# Patient Record
Sex: FEMALE | Race: WHITE | NOT HISPANIC OR LATINO | Employment: OTHER | URBAN - METROPOLITAN AREA
[De-identification: names, ages, dates, MRNs, and addresses within clinical notes are randomized per-mention and may not be internally consistent; named-entity substitution may affect disease eponyms.]

---

## 2017-03-16 ENCOUNTER — ANESTHESIA EVENT (OUTPATIENT)
Dept: PERIOP | Facility: AMBULARY SURGERY CENTER | Age: 37
End: 2017-03-16
Payer: COMMERCIAL

## 2017-03-17 ENCOUNTER — ANESTHESIA (OUTPATIENT)
Dept: PERIOP | Facility: AMBULARY SURGERY CENTER | Age: 37
End: 2017-03-17
Payer: COMMERCIAL

## 2017-03-24 ENCOUNTER — HOSPITAL ENCOUNTER (OUTPATIENT)
Dept: RADIOLOGY | Facility: HOSPITAL | Age: 37
Setting detail: OUTPATIENT SURGERY
Discharge: HOME | End: 2017-03-24
Payer: COMMERCIAL

## 2017-03-24 ENCOUNTER — HOSPITAL ENCOUNTER (OUTPATIENT)
Facility: AMBULARY SURGERY CENTER | Age: 37
Setting detail: OUTPATIENT SURGERY
Discharge: HOME | End: 2017-03-24
Attending: ANESTHESIOLOGY | Admitting: ANESTHESIOLOGY
Payer: COMMERCIAL

## 2017-03-24 VITALS
HEIGHT: 60 IN | WEIGHT: 150 LBS | TEMPERATURE: 97.6 F | OXYGEN SATURATION: 98 % | SYSTOLIC BLOOD PRESSURE: 141 MMHG | DIASTOLIC BLOOD PRESSURE: 81 MMHG | BODY MASS INDEX: 29.45 KG/M2 | HEART RATE: 68 BPM

## 2017-03-24 PROCEDURE — 72100 X-RAY EXAM L-S SPINE 2/3 VWS: CPT

## 2017-03-24 RX ORDER — SODIUM CHLORIDE, SODIUM LACTATE, POTASSIUM CHLORIDE, CALCIUM CHLORIDE 600; 310; 30; 20 MG/100ML; MG/100ML; MG/100ML; MG/100ML
100 INJECTION, SOLUTION INTRAVENOUS CONTINUOUS
Status: DISCONTINUED | OUTPATIENT
Start: 2017-03-24 | End: 2017-03-24 | Stop reason: HOSPADM

## 2017-03-24 RX ORDER — BUPIVACAINE HYDROCHLORIDE 2.5 MG/ML
INJECTION, SOLUTION EPIDURAL; INFILTRATION; INTRACAUDAL AS NEEDED
Status: DISCONTINUED | OUTPATIENT
Start: 2017-03-24 | End: 2017-03-24 | Stop reason: HOSPADM

## 2017-03-24 RX ORDER — SODIUM BICARBONATE 42 MG/ML
INJECTION, SOLUTION INTRAVENOUS AS NEEDED
Status: DISCONTINUED | OUTPATIENT
Start: 2017-03-24 | End: 2017-03-24 | Stop reason: HOSPADM

## 2017-03-24 RX ORDER — PROPOFOL 10 MG/ML
INJECTION, EMULSION INTRAVENOUS AS NEEDED
Status: DISCONTINUED | OUTPATIENT
Start: 2017-03-24 | End: 2017-03-24 | Stop reason: SURG

## 2017-03-24 RX ORDER — SODIUM CHLORIDE, SODIUM LACTATE, POTASSIUM CHLORIDE, CALCIUM CHLORIDE 600; 310; 30; 20 MG/100ML; MG/100ML; MG/100ML; MG/100ML
50 INJECTION, SOLUTION INTRAVENOUS CONTINUOUS
Status: DISCONTINUED | OUTPATIENT
Start: 2017-03-24 | End: 2017-03-24 | Stop reason: HOSPADM

## 2017-03-24 RX ORDER — BETAMETHASONE SODIUM PHOSPHATE AND BETAMETHASONE ACETATE 3; 3 MG/ML; MG/ML
INJECTION, SUSPENSION INTRA-ARTICULAR; INTRALESIONAL; INTRAMUSCULAR; SOFT TISSUE AS NEEDED
Status: DISCONTINUED | OUTPATIENT
Start: 2017-03-24 | End: 2017-03-24 | Stop reason: HOSPADM

## 2017-03-24 RX ORDER — FENTANYL CITRATE 50 UG/ML
INJECTION, SOLUTION INTRAMUSCULAR; INTRAVENOUS AS NEEDED
Status: DISCONTINUED | OUTPATIENT
Start: 2017-03-24 | End: 2017-03-24 | Stop reason: SURG

## 2017-03-24 RX ORDER — LIDOCAINE HYDROCHLORIDE 10 MG/ML
INJECTION, SOLUTION EPIDURAL; INFILTRATION; INTRACAUDAL; PERINEURAL AS NEEDED
Status: DISCONTINUED | OUTPATIENT
Start: 2017-03-24 | End: 2017-03-24 | Stop reason: HOSPADM

## 2017-03-24 RX ORDER — MIDAZOLAM HYDROCHLORIDE 1 MG/ML
INJECTION INTRAMUSCULAR; INTRAVENOUS AS NEEDED
Status: DISCONTINUED | OUTPATIENT
Start: 2017-03-24 | End: 2017-03-24 | Stop reason: SURG

## 2017-03-24 RX ADMIN — FENTANYL CITRATE 100 MCG: 50 INJECTION, SOLUTION INTRAMUSCULAR; INTRAVENOUS at 15:49

## 2017-03-24 RX ADMIN — MIDAZOLAM HYDROCHLORIDE 2 MG: 1 INJECTION, SOLUTION INTRAMUSCULAR; INTRAVENOUS at 15:49

## 2017-03-24 RX ADMIN — PROPOFOL 50 MG: 10 INJECTION, EMULSION INTRAVENOUS at 15:51

## 2017-03-24 RX ADMIN — FENTANYL CITRATE 100 MCG: 50 INJECTION, SOLUTION INTRAMUSCULAR; INTRAVENOUS at 15:50

## 2017-03-24 RX ADMIN — SODIUM CHLORIDE, SODIUM LACTATE, POTASSIUM CHLORIDE, AND CALCIUM CHLORIDE 50 ML/HR: .6; .31; .03; .02 INJECTION, SOLUTION INTRAVENOUS at 14:39

## 2017-03-24 RX ADMIN — PROPOFOL 20 MG: 10 INJECTION, EMULSION INTRAVENOUS at 15:52

## 2017-05-11 ENCOUNTER — HOSPITAL ENCOUNTER (EMERGENCY)
Facility: HOSPITAL | Age: 37
Discharge: HOME | End: 2017-05-11
Attending: EMERGENCY MEDICINE
Payer: COMMERCIAL

## 2017-05-11 VITALS
TEMPERATURE: 98.2 F | HEART RATE: 70 BPM | OXYGEN SATURATION: 98 % | WEIGHT: 130 LBS | BODY MASS INDEX: 25.39 KG/M2 | RESPIRATION RATE: 16 BRPM | DIASTOLIC BLOOD PRESSURE: 93 MMHG | SYSTOLIC BLOOD PRESSURE: 160 MMHG

## 2017-05-11 DIAGNOSIS — F32.A DEPRESSION: Primary | ICD-10-CM

## 2017-05-11 PROCEDURE — 99283 EMERGENCY DEPT VISIT LOW MDM: CPT

## 2017-05-11 RX ORDER — TRAZODONE HYDROCHLORIDE 50 MG/1
50 TABLET ORAL
Qty: 10 TABLET | Refills: 0 | Status: SHIPPED | OUTPATIENT
Start: 2017-05-11 | End: 2018-08-24

## 2018-08-24 ENCOUNTER — OFFICE VISIT (OUTPATIENT)
Dept: CARDIOLOGY CLINIC | Facility: CLINIC | Age: 38
End: 2018-08-24
Payer: COMMERCIAL

## 2018-08-24 VITALS
WEIGHT: 144 LBS | OXYGEN SATURATION: 98 % | DIASTOLIC BLOOD PRESSURE: 90 MMHG | BODY MASS INDEX: 28.27 KG/M2 | HEIGHT: 60 IN | HEART RATE: 85 BPM | SYSTOLIC BLOOD PRESSURE: 124 MMHG

## 2018-08-24 DIAGNOSIS — G89.4 CHRONIC PAIN SYNDROME: ICD-10-CM

## 2018-08-24 DIAGNOSIS — E78.5 DYSLIPIDEMIA: ICD-10-CM

## 2018-08-24 DIAGNOSIS — R07.9 CHEST PAIN, UNSPECIFIED TYPE: ICD-10-CM

## 2018-08-24 DIAGNOSIS — F41.9 ANXIETY: ICD-10-CM

## 2018-08-24 DIAGNOSIS — Z72.0 TOBACCO ABUSE: ICD-10-CM

## 2018-08-24 DIAGNOSIS — R00.2 PALPITATIONS: ICD-10-CM

## 2018-08-24 PROCEDURE — 99205 OFFICE O/P NEW HI 60 MIN: CPT | Performed by: INTERNAL MEDICINE

## 2018-08-24 PROCEDURE — 93000 ELECTROCARDIOGRAM COMPLETE: CPT | Performed by: INTERNAL MEDICINE

## 2018-08-24 RX ORDER — ACETAMINOPHEN/DIPHENHYDRAMINE 500MG-25MG
81 TABLET ORAL DAILY
Refills: 0 | COMMUNITY
Start: 2018-08-20 | End: 2020-04-28

## 2018-08-24 RX ORDER — METHOCARBAMOL 750 MG/1
50000 TABLET ORAL WEEKLY
Refills: 0 | COMMUNITY
Start: 2018-08-20

## 2018-08-24 RX ORDER — ALPRAZOLAM 1 MG/1
TABLET ORAL
Refills: 0 | COMMUNITY
Start: 2018-08-21

## 2018-08-24 RX ORDER — ATORVASTATIN CALCIUM 20 MG/1
20 TABLET, FILM COATED ORAL DAILY
Refills: 0 | COMMUNITY
Start: 2018-08-20 | End: 2020-04-28

## 2018-08-24 RX ORDER — CLONAZEPAM 1 MG/1
1 TABLET ORAL 2 TIMES DAILY
Refills: 0 | COMMUNITY
Start: 2018-06-28 | End: 2020-04-28

## 2018-08-24 RX ORDER — METOPROLOL SUCCINATE 50 MG/1
50 TABLET, EXTENDED RELEASE ORAL DAILY
Refills: 0 | COMMUNITY
Start: 2018-08-20

## 2018-08-24 RX ORDER — CLOTRIMAZOLE AND BETAMETHASONE DIPROPIONATE 10; .64 MG/G; MG/G
CREAM TOPICAL
Refills: 0 | COMMUNITY
Start: 2018-08-06 | End: 2020-04-28

## 2018-08-24 NOTE — PROGRESS NOTES
Consultation - Cardiology  Office  [facility]     Ligia Conway 43 y o  female MRN: [id number]  : 1980  Encounter: [id number]    Assessment:  1  Chest pain, unspecified type    2  Palpitations    3  Chronic pain syndrome    4  Anxiety    5  Dyslipidemia    6  Tobacco abuse        Discussion Summary & Plan:   1  Atypical chest pain  Patient is having multiple episodes of atypical chest pain  She also had a chronic pain syndrome  She has multiple issues related to her back pain  She status post neuro transmitted stimulator  Will check exercise stress test and echo Doppler  2   Palpitations  Patient has history of chronic palpitations  She is on metoprolol XL  Will check Holter monitor to rule out any other arrhythmias  She is already scheduled to have routine lab test done with her PMD   Will send us a copy  Any structural heart disease  Will check echo Doppler to rule out    3  Dyslipidemia  She is on chronic statin therapy management as per PMD   She was advised that she should be careful not to get pregnant  As these medications can have side effects  3   Chronic pain syndrome  She status post back surgery and neurostimulator  Management as per pain management    4  History of anxiety  She is on Xanax and clonazepam   Management as per her medical team     5   Continues tobacco abuse  Advised to quit smoking  Summary of Recommendations:        Exercise stress test  Echo Doppler  Patient was reassured  She need to use contraceptive as she is on multiple medications and these medications can have threatening affect on her featus if she becomes pregnant  Her significant other was present during all these discussions  Counseling :  A description of the counseling:   Goals and Barriers:  Patient's ability to self care: Yes  Medication side effect reviewed with patient in detail and all their questions answered to their satisfaction      Physician Requesting Consult: Donell Stoddard MD    Reason for Consult:  Palpitations and chest pain      HPI:     Dav Arnold is a 43y o  year old female who  Has past medical history significant for palpitations on chronic metoprolol therapy, hypertension, anxiety, dyslipidemia on atorvastatin, chronic pain syndrome, status post back surgery and is status post placement of neurostimulator in the back who came for palpitations  She is complaining about some palpitations and chest pain  Chest pain is once in a while but not with activities  She has also chronic back pain  She denies any fever or any chills any nausea and vomiting  She still spoke smokes about half pack a day  She was on multiple pain medication which she stopped  She is sexually active and she is not using any contraceptive  She has 6 kids already  No fever no chills no nausea no vomiting no other significant complaint  Review of Systems   Constitutional: Positive for activity change  Cardiovascular: Positive for chest pain and palpitations  Musculoskeletal: Positive for arthralgias and back pain  Psychiatric/Behavioral: The patient is nervous/anxious  All other systems reviewed and are negative  Historical Information   Past Medical History:   Diagnosis Date    Anxiety     Cardiac disease     Hypertension     Psychiatric disorder      Past Surgical History:   Procedure Laterality Date    BACK SURGERY      EPIDURAL BLOCK INJECTION Bilateral 3/24/2017    Procedure: BLOCK / INJECTION EPIDURAL STEROID TRANSFORAMINAL  L4-5, L5-S1;  Surgeon: Estuardo Willis MD;  Location: [place] [address] MAIN OR;  Service:     HYSTERECTOMY      IMPLANTATION / PLACEMENT EPIDURAL NEUROSTIMULATOR ELECTRODES      LEG SURGERY         Social History:  History   Alcohol Use No     History   Drug Use No     History   Smoking Status    Current Every Day Smoker    Packs/day: 1 00   Smokeless Tobacco    Never Used        No family history on file      Meds/Allergies Allergies   Allergen Reactions    Amoxicillin     Other      Adhesive tape    Penicillins        Current medications:    Current Outpatient Prescriptions:     ALPRAZolam (XANAX) 1 mg tablet, TAKE 1 AND 1/2 TABLET 3 TIMES A DAY AS NEEDED, Disp: , Rfl: 0    atorvastatin (LIPITOR) 20 mg tablet, Take 20 mg by mouth daily, Disp: , Rfl: 0    clonazePAM (KlonoPIN) 1 mg tablet, Take 1 mg by mouth 2 (two) times a day, Disp: , Rfl: 0    clotrimazole-betamethasone (LOTRISONE) 1-0 05 % cream, APPLY AND RUB IN A THIN FILM TO THE AFFECTED AREAS TWICE DAILY IN THE MORNING AND EVENING, Disp: , Rfl: 0    D3-50 10244 units capsule, Take 50,000 Units by mouth once a week, Disp: , Rfl: 0    metoprolol succinate (TOPROL-XL) 50 mg 24 hr tablet, Take 50 mg by mouth daily, Disp: , Rfl: 0    RA ASPIRIN EC 81 MG EC tablet, Take 81 mg by mouth daily, Disp: , Rfl: 0    VENTOLIN  (90 Base) MCG/ACT inhaler, Inhale 2 puffs every 4 (four) hours as needed, Disp: , Rfl: 0      Objective:     Vitals: Blood pressure 124/90, pulse 85, height 5' (1 524 m), weight 65 3 kg (144 lb), SpO2 98 %, not currently breastfeeding  Body mass index is 28 12 kg/m²  Vitals:    08/24/18 1639   Weight: 65 3 kg (144 lb)     BP Readings from Last 3 Encounters:   08/24/18 124/90   05/11/17 160/93   03/24/17 141/81         Physical Exam:     Physical Exam    Neurologic:  Alert & oriented x 3, no new focal deficits, Not in any acute distress,  Constitutional:  Well developed, well nourished, non-toxic appearance   Eyes:  Pupil equal and reacting to light, conjunctiva normal, No JVP, No LNP   HENT:  Atraumatic, oropharynx moist, Neck- normal range of motion, no tenderness,  Neck supple   Respiratory:  Bilateral air entry, mostly clear to auscultation  Cardiovascular: S1-S2 regular with a 2/6 ejection systolic murmur   GI:  Soft, nondistended, normal bowel sounds, nontender, no hepatosplenomegaly appreciated    Musculoskeletal:  No edema, no tenderness, no deformities  Skin:  Well hydrated, no rash   Lymphatic:  No lymphadenopathy noted   Extremities:  No edema and distal pulses are present    Labs:     Lab Results   Component Value Date    WBC 11 30 (H) 2016    HGB 14 2 2016    HCT 42 5 2016    MCV 88 2016    RDW 13 4 2016     2016     BMP:  Lab Results   Component Value Date     2016    K 4 4 2016     2016    CO2 27 2016    ANIONGAP 8 2016    BUN 8 2016    CREATININE 0 61 2016    GLUCOSE 101 2016    CALCIUM 9 2 2016    EGFR >60 0 2016     LFT:  Lab Results   Component Value Date    AST 18 2016    ALT 31 2016    ALKPHOS 91 2016    PROT 7 2 2016    BILITOT 0 40 2016         Imaging & Testing     Cardiac testing:       Diagnostic Studies Review Cardio:      None recently    EK lead EKG shows normal sinus rhythm heart rate 85 beats per minute  No significant ST changes  Dr Frederick Meza MD [facility] [place]      "This note has been constructed using a voice recognition system  Therefore there may be syntax, spelling, and/or grammatical errors  Please call if you have any questions

## 2018-08-24 NOTE — LETTER
August 24, 2018     Obdulia Hernandez, [address]  [address]  [address] [place] [id number]    Patient: Aaliyah Perkins   YOB: 1980   Date of Visit: 8/24/2018     Dear Dr Mine Seaman      Thank you for referring Sailaja Riddle to me for evaluation  Below are the relevant portions of my assessment and plan of care  If you have questions, please do not hesitate to call me  I look forward to following Liseth Mckay along with you  Sincerely,        Joseline Mcfarlane MD        CC: No Recipients      Discussion Summary & Plan:   1  Atypical chest pain  Patient is having multiple episodes of atypical chest pain  She also had a chronic pain syndrome  She has multiple issues related to her back pain  She status post neuro transmitted stimulator  Will check exercise stress test and echo Doppler  2   Palpitations  Patient has history of chronic palpitations  She is on metoprolol XL  Will check Holter monitor to rule out any other arrhythmias  She is already scheduled to have routine lab test done with her PMD   Will send us a copy  Any structural heart disease  Will check echo Doppler to rule out    3  Dyslipidemia  She is on chronic statin therapy management as per PMD   She was advised that she should be careful not to get pregnant  As these medications can have side effects  3   Chronic pain syndrome  She status post back surgery and neurostimulator  Management as per pain management    4  History of anxiety  She is on Xanax and clonazepam   Management as per her medical team     5   Continues tobacco abuse  Advised to quit smoking  Summary of Recommendations:        Exercise stress test  Echo Doppler  Patient was reassured  She need to use contraceptive as she is on multiple medications and these medications can have threatening affect on her featus if she becomes pregnant  Her significant other was present during all these discussions      Counseling :  A description of the counseling:   Goals and Barriers:  Patient's ability to self care: Yes  Medication side effect reviewed with patient in detail and all their questions answered to their satisfaction  Physician Requesting Consult: Delaney Zambrano MD    Reason for Consult:  Palpitations and chest pain      HPI:     Alexis Carrera is a 43y o  year old female who  Has past medical history significant for palpitations on chronic metoprolol therapy, hypertension, anxiety, dyslipidemia on atorvastatin, chronic pain syndrome, status post back surgery and is status post placement of neurostimulator in the back who came for palpitations  She is complaining about some palpitations and chest pain  Chest pain is once in a while but not with activities  She has also chronic back pain  She denies any fever or any chills any nausea and vomiting  She still spoke smokes about half pack a day  She was on multiple pain medication which she stopped  She is sexually active and she is not using any contraceptive  She has 6 kids already  No fever no chills no nausea no vomiting no other significant complaint  Review of Systems   Constitutional: Positive for activity change  Cardiovascular: Positive for chest pain and palpitations  Musculoskeletal: Positive for arthralgias and back pain  Psychiatric/Behavioral: The patient is nervous/anxious  All other systems reviewed and are negative        Historical Information   Past Medical History:   Diagnosis Date    Anxiety     Cardiac disease     Hypertension     Psychiatric disorder      Past Surgical History:   Procedure Laterality Date    BACK SURGERY      EPIDURAL BLOCK INJECTION Bilateral 3/24/2017    Procedure: BLOCK / INJECTION EPIDURAL STEROID TRANSFORAMINAL  L4-5, L5-S1;  Surgeon: Edie Albright MD;  Location: [place] [address] MAIN OR;  Service:     HYSTERECTOMY      IMPLANTATION / PLACEMENT EPIDURAL NEUROSTIMULATOR ELECTRODES      LEG SURGERY         Social History:  History Alcohol Use No     History   Drug Use No     History   Smoking Status    Current Every Day Smoker    Packs/day: 1 00   Smokeless Tobacco    Never Used        No family history on file  Meds/Allergies     Allergies   Allergen Reactions    Amoxicillin     Other      Adhesive tape    Penicillins        Current medications:    Current Outpatient Prescriptions:     ALPRAZolam (XANAX) 1 mg tablet, TAKE 1 AND 1/2 TABLET 3 TIMES A DAY AS NEEDED, Disp: , Rfl: 0    atorvastatin (LIPITOR) 20 mg tablet, Take 20 mg by mouth daily, Disp: , Rfl: 0    clonazePAM (KlonoPIN) 1 mg tablet, Take 1 mg by mouth 2 (two) times a day, Disp: , Rfl: 0    clotrimazole-betamethasone (LOTRISONE) 1-0 05 % cream, APPLY AND RUB IN A THIN FILM TO THE AFFECTED AREAS TWICE DAILY IN THE MORNING AND EVENING, Disp: , Rfl: 0    D3-50 09578 units capsule, Take 50,000 Units by mouth once a week, Disp: , Rfl: 0    metoprolol succinate (TOPROL-XL) 50 mg 24 hr tablet, Take 50 mg by mouth daily, Disp: , Rfl: 0    RA ASPIRIN EC 81 MG EC tablet, Take 81 mg by mouth daily, Disp: , Rfl: 0    VENTOLIN  (90 Base) MCG/ACT inhaler, Inhale 2 puffs every 4 (four) hours as needed, Disp: , Rfl: 0      Objective:     Vitals: Blood pressure 124/90, pulse 85, height 5' (1 524 m), weight 65 3 kg (144 lb), SpO2 98 %, not currently breastfeeding  Body mass index is 28 12 kg/m²    Vitals:    08/24/18 1639   Weight: 65 3 kg (144 lb)     BP Readings from Last 3 Encounters:   08/24/18 124/90   05/11/17 160/93   03/24/17 141/81         Physical Exam:     Physical Exam    Neurologic:  Alert & oriented x 3, no new focal deficits, Not in any acute distress,  Constitutional:  Well developed, well nourished, non-toxic appearance   Eyes:  Pupil equal and reacting to light, conjunctiva normal, No JVP, No LNP   HENT:  Atraumatic, oropharynx moist, Neck- normal range of motion, no tenderness,  Neck supple   Respiratory:  Bilateral air entry, mostly clear to auscultation  Cardiovascular: S1-S2 regular with a 2/6 ejection systolic murmur   GI:  Soft, nondistended, normal bowel sounds, nontender, no hepatosplenomegaly appreciated  Musculoskeletal:  No edema, no tenderness, no deformities  Skin:  Well hydrated, no rash   Lymphatic:  No lymphadenopathy noted   Extremities:  No edema and distal pulses are present    Labs:     Lab Results   Component Value Date    WBC 11 30 (H) 2016    HGB 14 2 2016    HCT 42 5 2016    MCV 88 2016    RDW 13 4 2016     2016     BMP:  Lab Results   Component Value Date     2016    K 4 4 2016     2016    CO2 27 2016    ANIONGAP 8 2016    BUN 8 2016    CREATININE 0 61 2016    GLUCOSE 101 2016    CALCIUM 9 2 2016    EGFR >60 0 2016     LFT:  Lab Results   Component Value Date    AST 18 2016    ALT 31 2016    ALKPHOS 91 2016    PROT 7 2 2016    BILITOT 0 40 2016         Imaging & Testing     Cardiac testing:       Diagnostic Studies Review Cardio:      None recently    EK lead EKG shows normal sinus rhythm heart rate 85 beats per minute  No significant ST changes  Dr Servando Black MD [facility] [place]      "This note has been constructed using a voice recognition system  Therefore there may be syntax, spelling, and/or grammatical errors  Please call if you have any questions

## 2018-09-12 ENCOUNTER — HOSPITAL ENCOUNTER (OUTPATIENT)
Dept: NON INVASIVE DIAGNOSTICS | Facility: HOSPITAL | Age: 38
Discharge: HOME | End: 2018-09-12
Attending: INTERNAL MEDICINE

## 2018-10-25 ENCOUNTER — HOSPITAL ENCOUNTER (EMERGENCY)
Facility: HOSPITAL | Age: 38
Discharge: HOME | End: 2018-10-25
Attending: EMERGENCY MEDICINE | Admitting: EMERGENCY MEDICINE
Payer: COMMERCIAL

## 2018-10-25 ENCOUNTER — HOSPITAL ENCOUNTER (OUTPATIENT)
Dept: NON INVASIVE DIAGNOSTICS | Facility: HOSPITAL | Age: 38
Discharge: HOME | End: 2018-10-25
Attending: INTERNAL MEDICINE

## 2018-10-25 ENCOUNTER — APPOINTMENT (EMERGENCY)
Dept: RADIOLOGY | Facility: HOSPITAL | Age: 38
End: 2018-10-25
Payer: COMMERCIAL

## 2018-10-25 VITALS
RESPIRATION RATE: 18 BRPM | WEIGHT: 130 LBS | TEMPERATURE: 98.5 F | DIASTOLIC BLOOD PRESSURE: 72 MMHG | SYSTOLIC BLOOD PRESSURE: 122 MMHG | BODY MASS INDEX: 25.52 KG/M2 | HEIGHT: 60 IN | OXYGEN SATURATION: 97 % | HEART RATE: 72 BPM

## 2018-10-25 DIAGNOSIS — J40 BRONCHITIS: Primary | ICD-10-CM

## 2018-10-25 PROCEDURE — 94640 AIRWAY INHALATION TREATMENT: CPT

## 2018-10-25 PROCEDURE — 99283 EMERGENCY DEPT VISIT LOW MDM: CPT

## 2018-10-25 PROCEDURE — 71046 X-RAY EXAM CHEST 2 VIEWS: CPT

## 2018-10-25 RX ORDER — PREDNISONE 50 MG/1
50 TABLET ORAL DAILY
Qty: 5 TABLET | Refills: 0 | Status: SHIPPED | OUTPATIENT
Start: 2018-10-25 | End: 2018-10-30

## 2018-10-25 RX ORDER — BENZONATATE 100 MG/1
100 CAPSULE ORAL EVERY 8 HOURS
Qty: 21 CAPSULE | Refills: 0 | Status: SHIPPED | OUTPATIENT
Start: 2018-10-25 | End: 2020-04-28

## 2018-10-25 RX ORDER — ALBUTEROL SULFATE 2.5 MG/3ML
2.5 SOLUTION RESPIRATORY (INHALATION) EVERY 6 HOURS PRN
Qty: 75 ML | Refills: 0 | Status: SHIPPED | OUTPATIENT
Start: 2018-10-25 | End: 2020-04-28

## 2018-10-25 RX ORDER — PREDNISONE 20 MG/1
60 TABLET ORAL ONCE
Status: COMPLETED | OUTPATIENT
Start: 2018-10-25 | End: 2018-10-25

## 2018-10-25 RX ORDER — AZITHROMYCIN 250 MG/1
TABLET, FILM COATED ORAL
Qty: 6 TABLET | Refills: 0 | Status: SHIPPED | OUTPATIENT
Start: 2018-10-25 | End: 2018-10-29

## 2018-10-25 RX ADMIN — IPRATROPIUM BROMIDE 0.5 MG: 0.5 SOLUTION RESPIRATORY (INHALATION) at 09:42

## 2018-10-25 RX ADMIN — PREDNISONE 60 MG: 20 TABLET ORAL at 09:42

## 2018-10-25 RX ADMIN — ALBUTEROL SULFATE 5 MG: 2.5 SOLUTION RESPIRATORY (INHALATION) at 09:41

## 2018-10-25 NOTE — ED PROVIDER NOTES
History  Chief Complaint   Patient presents with    Cough     c/o productive cough for over a week, Did see PCP at that time was given robitussin states it's not helpin     14-year-old female with past medical history of asthma, hypertension, anxiety, chronic daily smoker, presents to the ER with complaint of generalized cough, congestion, shortness of breath over the past week  Patient states she ran out of her albuterol neb solution at home  Patient saw her PCP initially and was diagnosed with URI symptoms  Patient was given Phenergan with codeine for cough which is not helping  Patient came to the ER for further evaluation today  Patient denies any fevers, chills, chest pain, nausea, vomiting, diarrhea, dysuria, headaches, weakness, dizziness  History provided by:  Patient  Cough   Associated symptoms: shortness of breath    Associated symptoms: no chest pain, no chills, no ear pain, no eye discharge, no fever, no headaches, no rash and no wheezing        Prior to Admission Medications   Prescriptions Last Dose Informant Patient Reported? Taking?    ALPRAZolam (XANAX) 1 mg tablet 10/24/2018 at Unknown time Self Yes Yes   Sig: TAKE 1 AND 1/2 TABLET 3 TIMES A DAY AS NEEDED   D3-50 05417 units capsule Past Week at Unknown time Self Yes Yes   Sig: Take 50,000 Units by mouth once a week   RA ASPIRIN EC 81 MG EC tablet 10/24/2018 at Unknown time Self Yes Yes   Sig: Take 81 mg by mouth daily   VENTOLIN  (90 Base) MCG/ACT inhaler 10/25/2018 at Unknown time Self Yes Yes   Sig: Inhale 2 puffs every 4 (four) hours as needed   atorvastatin (LIPITOR) 20 mg tablet Not Taking at Unknown time Self Yes No   Sig: Take 20 mg by mouth daily   clonazePAM (KlonoPIN) 1 mg tablet 10/25/2018 at Unknown time Self Yes Yes   Sig: Take 1 mg by mouth 2 (two) times a day   clotrimazole-betamethasone (LOTRISONE) 1-0 05 % cream Not Taking at Unknown time Self Yes No   Sig: APPLY AND RUB IN A THIN FILM TO THE AFFECTED AREAS TWICE DAILY IN THE MORNING AND EVENING   guaiFENesin (ROBITUSSIN) 100 MG/5ML oral liquid 10/24/2018 at Unknown time  Yes Yes   Sig: Take 400 mg by mouth 3 (three) times a day as needed for cough   metoprolol succinate (TOPROL-XL) 50 mg 24 hr tablet 10/25/2018 at Unknown time Self Yes Yes   Sig: Take 50 mg by mouth daily      Facility-Administered Medications: None       Past Medical History:   Diagnosis Date    Anxiety     Cardiac disease     Chronic back pain     Hypertension     Psychiatric disorder        Past Surgical History:   Procedure Laterality Date    BACK SURGERY      EPIDURAL BLOCK INJECTION Bilateral 3/24/2017    Procedure: BLOCK / INJECTION EPIDURAL STEROID TRANSFORAMINAL  L4-5, L5-S1;  Surgeon: Kath Fritz MD;  Location: [facility] MAIN OR;  Service:     IMPLANTATION / PLACEMENT EPIDURAL NEUROSTIMULATOR ELECTRODES      LEG SURGERY      ORTHOPEDIC SURGERY         History reviewed  No pertinent family history  I have reviewed and agree with the history as documented  Social History   Substance Use Topics    Smoking status: Current Every Day Smoker     Packs/day: 1 00    Smokeless tobacco: Never Used    Alcohol use No        Review of Systems   Constitutional: Negative for activity change, appetite change, chills and fever  HENT: Negative for congestion and ear pain  Eyes: Negative for pain and discharge  Respiratory: Positive for cough and shortness of breath  Negative for chest tightness, wheezing and stridor  Cardiovascular: Negative for chest pain and palpitations  Gastrointestinal: Negative for abdominal distention, abdominal pain, constipation, diarrhea and nausea  Endocrine: Negative for cold intolerance  Genitourinary: Negative for dysuria, frequency and urgency  Musculoskeletal: Negative for arthralgias and back pain  Skin: Negative for color change and rash  Allergic/Immunologic: Negative for environmental allergies and food allergies     Neurological: Negative for dizziness, weakness, numbness and headaches  Hematological: Negative for adenopathy  Psychiatric/Behavioral: Negative for agitation, behavioral problems and confusion  The patient is not nervous/anxious  All other systems reviewed and are negative  Physical Exam  Physical Exam   Constitutional: She is oriented to person, place, and time  She appears well-developed and well-nourished  HENT:   Head: Normocephalic and atraumatic  Right Ear: External ear normal    Left Ear: External ear normal    Erythematous posterior oropharynx without any exudates  Eyes: Conjunctivae and EOM are normal    Neck: Normal range of motion  Neck supple  Cardiovascular: Normal rate, regular rhythm, normal heart sounds and intact distal pulses  Pulmonary/Chest: Effort normal  She has wheezes  Scattered wheezing noted bilateral    Abdominal: Soft  Bowel sounds are normal  She exhibits no distension  There is no tenderness  Musculoskeletal: Normal range of motion  Neurological: She is alert and oriented to person, place, and time  Skin: Skin is warm and dry  Psychiatric: She has a normal mood and affect  Her behavior is normal  Judgment and thought content normal    Nursing note and vitals reviewed        Vital Signs  ED Triage Vitals [10/25/18 0853]   Temperature Pulse Respirations Blood Pressure SpO2   98 5 °F (36 9 °C) 78 18 122/72 98 %      Temp Source Heart Rate Source Patient Position - Orthostatic VS BP Location FiO2 (%)   Oral Monitor Lying Right arm --      Pain Score       --           Vitals:    10/25/18 0853 10/25/18 0900   BP: 122/72 122/72   Pulse: 78 72   Patient Position - Orthostatic VS: Lying        Visual Acuity      ED Medications  Medications   albuterol inhalation solution 5 mg (5 mg Nebulization Given 10/25/18 0941)   ipratropium (ATROVENT) 0 02 % inhalation solution 0 5 mg (0 5 mg Nebulization Given 10/25/18 0942)   predniSONE tablet 60 mg (60 mg Oral Given 10/25/18 0942) Diagnostic Studies  Results Reviewed     None                 XR chest 2 views   Final Result by Mary Champagne MD (10/25 0932)      No acute cardiopulmonary disease  Workstation performed: [id number]                    Procedures  Procedures       Phone Contacts  ED Phone Contact    ED Course                               MDM  Number of Diagnoses or Management Options  Bronchitis:   Diagnosis management comments: Obtain x-ray to rule out pneumonia  Give prednisone, neb treatment and reassess symptoms  Amount and/or Complexity of Data Reviewed  Tests in the radiology section of CPT®: ordered and reviewed  Tests in the medicine section of CPT®: ordered and reviewed  Independent visualization of images, tracings, or specimens: yes    Risk of Complications, Morbidity, and/or Mortality  General comments: X-ray unremarkable for any acute pneumonia  The lung sounds improved with neb treatment and steroids in the ER  At this point patient's symptoms are consistent with acute bronchitis  Patient is discharged home on Z-Yfn, prednisone burst, Tessalon Perles and follow-up to PCP in 3-4 days  Close return instructions given to return to the ER for any worsening symptoms  Patient agrees with discharge plan  Patient well appearing at time of discharge  Patient Progress  Patient progress: improved    CritCare Time    Disposition  Final diagnoses:   Bronchitis     Time reflects when diagnosis was documented in both MDM as applicable and the Disposition within this note     Time User Action Codes Description Comment    10/25/2018 10:15 AM Julio C Crockett Add [J40] Bronchitis       ED Disposition     ED Disposition Condition Comment    Discharge  Donell Aden discharge to home/self care  Condition at discharge: Good        Follow-up Information     Follow up With Specialties Details Why Radha Toribio MD Internal Medicine In 4 days  [address]   [employer] [address] [facility]  [phone]            Discharge Medication List as of 10/25/2018 10:24 AM      START taking these medications    Details   albuterol (2 5 mg/3 mL) 0 083 % nebulizer solution Take 1 vial (2 5 mg total) by nebulization every 6 (six) hours as needed for wheezing or shortness of breath, Starting Thu 10/25/2018, Print      azithromycin (ZITHROMAX) 250 mg tablet Take 2 tablets today then 1 tablet daily x 4 days, Print      benzonatate (TESSALON PERLES) 100 mg capsule Take 1 capsule (100 mg total) by mouth every 8 (eight) hours, Starting Thu 10/25/2018, Print      predniSONE 50 mg tablet Take 1 tablet (50 mg total) by mouth daily for 5 days, Starting Thu 10/25/2018, Until Tue 10/30/2018, Print         CONTINUE these medications which have NOT CHANGED    Details   ALPRAZolam (XANAX) 1 mg tablet TAKE 1 AND 1/2 TABLET 3 TIMES A DAY AS NEEDED, Historical Med      clonazePAM (KlonoPIN) 1 mg tablet Take 1 mg by mouth 2 (two) times a day, Starting Thu 6/28/2018, Historical Med      D3-50 96533 units capsule Take 50,000 Units by mouth once a week, Starting Mon 8/20/2018, Historical Med      guaiFENesin (ROBITUSSIN) 100 MG/5ML oral liquid Take 400 mg by mouth 3 (three) times a day as needed for cough, Historical Med      metoprolol succinate (TOPROL-XL) 50 mg 24 hr tablet Take 50 mg by mouth daily, Starting Mon 8/20/2018, Historical Med      RA ASPIRIN EC 81 MG EC tablet Take 81 mg by mouth daily, Starting Mon 8/20/2018, Historical Med      VENTOLIN  (90 Base) MCG/ACT inhaler Inhale 2 puffs every 4 (four) hours as needed, Starting Mon 8/20/2018, Historical Med      atorvastatin (LIPITOR) 20 mg tablet Take 20 mg by mouth daily, Starting Mon 8/20/2018, Historical Med      clotrimazole-betamethasone (LOTRISONE) 1-0 05 % cream APPLY AND RUB IN A THIN FILM TO THE AFFECTED AREAS TWICE DAILY IN THE MORNING AND EVENING, Historical Med           No discharge procedures on file      ED Provider  Electronically Signed by           Mitchell Garcia DO  10/25/18 1040

## 2018-10-25 NOTE — DISCHARGE INSTRUCTIONS
Please take a list of all of your medications and discharge paperwork with you to all of your follow-up medical visits  Please take all of your medications as directed  Please call your family doctor or return to the ER if you have increased shortness of breath, chest pain, fevers, chills, nausea, vomiting, diarrhea, or any other worsening symptoms  Acute Bronchitis   WHAT YOU NEED TO KNOW:   What is acute bronchitis? Acute bronchitis is swelling and irritation in the air passages of your lungs  This irritation may cause you to cough or have other breathing problems  Acute bronchitis often starts because of another illness, such as a cold or the flu  The illness spreads from your nose and throat to your windpipe and airways  Bronchitis is often called a chest cold  Acute bronchitis lasts about 3 to 6 weeks and is usually not a serious illness  What causes acute bronchitis? · Infection  caused by a virus, bacteria, or a fungus    · Polluted air  caused by chemical fumes, dust, smoke, allergens, or pollution  What increases my risk for acute bronchitis? · Age, usually older adults    · Smoking cigarettes or being around cigarette smoke    · Chronic lung diseases or chronic sinus infections    · Weakened immune system    · Gastroesophageal reflux disease    · Allergies and environmental changes  What are the signs and symptoms of acute bronchitis? · A cough with sputum that may be clear, yellow, or green    · Feeling more tired than usual, and body aches    · A fever and chills    · Wheezing when you breathe    · A tight chest or pain when you breathe or cough  How is acute bronchitis diagnosed? Your healthcare provider may diagnose bronchitis by your symptoms  If he is not sure, you may need the following:  · Blood tests  will be done to see if your symptoms are caused by an infection  · X-ray  pictures of your lungs and heart may show signs of infection, such as pneumonia   Chest x-rays may also show fluid around your heart and lungs  How is acute bronchitis treated? Your healthcare provider will treat any condition that has caused your acute bronchitis  He may also give you any of the following:  · Ibuprofen or acetaminophen  are medicines that help lower your fever  They are available without a doctor's order  Ask your healthcare provider which medicine is right for you  Ask how much to take and how often to take it  Follow directions  These medicines can cause stomach bleeding if not taken correctly  Ibuprofen can cause kidney damage  Do not take ibuprofen if you have kidney disease, an ulcer, or allergies to aspirin  Acetaminophen can cause liver damage  Do not take more than 4,000 milligrams in 24 hours  · Decongestants  help loosen mucus in your lungs and make it easier to cough up  This can help you breathe easier  · Cough suppressants  decrease your urge to cough  If your cough produces mucus, do not take a cough suppressant unless your healthcare provider tells you to  Your healthcare provider may suggest that you take a cough suppressant at night so you can rest     · Inhalers  may be given  Your healthcare provider may give you one or more inhalers to help you breathe easier and cough less  An inhaler gives your medicine to open your airways  Ask your healthcare provider to show you how to use your inhaler correctly  How can I care for myself when I have acute bronchitis? · Get more rest   Rest helps your body to heal  Slowly start to do more each day  Rest when you feel it is needed  · Avoid irritants in the air  Avoid chemicals, fumes, and dust  Wear a face mask if you must work around dust or fumes  Stay inside on days when air pollution levels are high  If you have allergies, stay inside when pollen counts are high  Do not use aerosol products, such as spray-on deodorant, bug spray, and hair spray  · Do not smoke or be around others who smoke    Nicotine and other chemicals in cigarettes and cigars damages the cilia that move mucus out of your lungs  Ask your healthcare provider for information if you currently smoke and need help to quit  E-cigarettes or smokeless tobacco still contain nicotine  Talk to your healthcare provider before you use these products  · Drink liquids as directed  Liquids help keep your air passages moist and help you cough up mucus  You may need to drink more liquids when you have acute bronchitis  Ask how much liquid to drink each day and which liquids are best for you  · Use a humidifier or vaporizer  Use a cool mist humidifier or a vaporizer to increase air moisture in your home  This may make it easier for you to breathe and help decrease your cough  How can I decrease my risk for acute bronchitis? · Get the vaccinations you need  Ask your healthcare provider if you should get vaccinated against the flu or pneumonia  · Prevent the spread of germs  You can decrease your risk of acute bronchitis and other illnesses by doing the following:     [facility] [employer] your hands often with soap and water  Carry germ-killing hand lotion or gel with you  You can use the lotion or gel to clean your hands when soap and water are not available  ¨ Do not touch your eyes, nose, or mouth unless you have washed your hands first     ¨ Always cover your mouth when you cough to prevent the spread of germs  It is best to cough into a tissue or your shirt sleeve instead of into your hand  Ask those around you cover their mouths when they cough  ¨ Try to avoid people who have a cold or the flu  If you are sick, stay away from others as much as possible  When should I seek immediate care? · You cough up blood  · Your lips or fingernails turn blue  · You feel like you are not getting enough air when you breathe  When should I contact my healthcare provider? · You have a fever  · Your breathing problems do not go away or get worse      · Your cough does not get better within 4 weeks  · You have questions or concerns about your condition or care  CARE AGREEMENT:   You have the right to help plan your care  Learn about your health condition and how it may be treated  Discuss treatment options with your caregivers to decide what care you want to receive  You always have the right to refuse treatment  The above information is an  only  It is not intended as medical advice for individual conditions or treatments  Talk to your doctor, nurse or pharmacist before following any medical regimen to see if it is safe and effective for you  © 2017 [employer]  Information is for End User's use only and may not be sold, redistributed or otherwise used for commercial purposes  All illustrations and images included in CareNotes® are the copyrighted property of [employer]  or [employer]

## 2019-02-15 ENCOUNTER — TELEPHONE (OUTPATIENT)
Dept: CARDIOLOGY CLINIC | Facility: CLINIC | Age: 39
End: 2019-02-15

## 2019-02-15 NOTE — TELEPHONE ENCOUNTER
She has an appt next week and the referral in [address] [place] [address] expires on 2/19/19  Please let us know when a new referral is done    Thank you

## 2020-04-28 ENCOUNTER — HOSPITAL ENCOUNTER (EMERGENCY)
Facility: HOSPITAL | Age: 40
Discharge: HOME | End: 2020-04-28
Attending: EMERGENCY MEDICINE | Admitting: EMERGENCY MEDICINE
Payer: COMMERCIAL

## 2020-04-28 ENCOUNTER — APPOINTMENT (EMERGENCY)
Dept: RADIOLOGY | Facility: HOSPITAL | Age: 40
End: 2020-04-28
Payer: COMMERCIAL

## 2020-04-28 VITALS
TEMPERATURE: 98.7 F | OXYGEN SATURATION: 100 % | RESPIRATION RATE: 18 BRPM | WEIGHT: 140 LBS | DIASTOLIC BLOOD PRESSURE: 93 MMHG | HEART RATE: 103 BPM | HEIGHT: 61 IN | BODY MASS INDEX: 26.43 KG/M2 | SYSTOLIC BLOOD PRESSURE: 190 MMHG

## 2020-04-28 DIAGNOSIS — K04.7 DENTAL ABSCESS: Primary | ICD-10-CM

## 2020-04-28 DIAGNOSIS — R22.0 FACIAL SWELLING: ICD-10-CM

## 2020-04-28 PROCEDURE — 96367 TX/PROPH/DG ADDL SEQ IV INF: CPT

## 2020-04-28 PROCEDURE — 96365 THER/PROPH/DIAG IV INF INIT: CPT

## 2020-04-28 PROCEDURE — 99284 EMERGENCY DEPT VISIT MOD MDM: CPT | Performed by: EMERGENCY MEDICINE

## 2020-04-28 PROCEDURE — 70487 CT MAXILLOFACIAL W/DYE: CPT

## 2020-04-28 PROCEDURE — 99284 EMERGENCY DEPT VISIT MOD MDM: CPT

## 2020-04-28 RX ORDER — CEFTRIAXONE 1 G/50ML
1000 INJECTION, SOLUTION INTRAVENOUS ONCE
Status: COMPLETED | OUTPATIENT
Start: 2020-04-28 | End: 2020-04-28

## 2020-04-28 RX ORDER — TRAMADOL HYDROCHLORIDE 50 MG/1
50 TABLET ORAL EVERY 6 HOURS PRN
Qty: 30 TABLET | Refills: 0 | Status: SHIPPED | OUTPATIENT
Start: 2020-04-28 | End: 2020-05-08

## 2020-04-28 RX ORDER — VANCOMYCIN HYDROCHLORIDE 1 G/200ML
15 INJECTION, SOLUTION INTRAVENOUS ONCE
Status: COMPLETED | OUTPATIENT
Start: 2020-04-28 | End: 2020-04-28

## 2020-04-28 RX ORDER — CEPHALEXIN 500 MG/1
500 CAPSULE ORAL EVERY 6 HOURS SCHEDULED
Qty: 40 CAPSULE | Refills: 0 | Status: SHIPPED | OUTPATIENT
Start: 2020-04-28 | End: 2020-05-08

## 2020-04-28 RX ORDER — CITALOPRAM 10 MG/1
10 TABLET ORAL DAILY
COMMUNITY

## 2020-04-28 RX ORDER — ACETAMINOPHEN 325 MG/1
650 TABLET ORAL ONCE
Status: COMPLETED | OUTPATIENT
Start: 2020-04-28 | End: 2020-04-28

## 2020-04-28 RX ADMIN — CEFTRIAXONE 1000 MG: 1 INJECTION, SOLUTION INTRAVENOUS at 09:22

## 2020-04-28 RX ADMIN — IOHEXOL 85 ML: 350 INJECTION, SOLUTION INTRAVENOUS at 08:13

## 2020-04-28 RX ADMIN — ACETAMINOPHEN 650 MG: 325 TABLET ORAL at 09:52

## 2020-04-28 RX ADMIN — VANCOMYCIN HYDROCHLORIDE 1000 MG: 1 INJECTION, SOLUTION INTRAVENOUS at 08:02

## 2021-09-29 ENCOUNTER — TELEPHONE (OUTPATIENT)
Dept: FAMILY MEDICINE CLINIC | Facility: CLINIC | Age: 41
End: 2021-09-29

## 2021-09-29 NOTE — TELEPHONE ENCOUNTER
Lmom for patient to return my call  Patient was a No Show for her appointment today, I called to see if she would like to reschedule her [address] appointment    BERKELY Skinner/GREG

## (undated) DEVICE — NEEDLE SPINAL 22G X 5IN QUINCKE

## (undated) DEVICE — GLOVE SRG BIOGEL 7.5

## (undated) DEVICE — SMALL NEEDLE COUNTER NEST

## (undated) DEVICE — SKIN MARKER DUAL TIP WITH RULER CAP, FLEXIBLE RULER AND LABELS: Brand: DEVON

## (undated) DEVICE — RADIOLOGY STERILE LABELS: Brand: CENTURION

## (undated) DEVICE — NEEDLE SPINAL 20G X 3.5 LF

## (undated) DEVICE — SYRINGE 10ML LL

## (undated) DEVICE — NEEDLE 18 G X 1 1/2